# Patient Record
Sex: FEMALE | Race: WHITE | Employment: FULL TIME | URBAN - METROPOLITAN AREA
[De-identification: names, ages, dates, MRNs, and addresses within clinical notes are randomized per-mention and may not be internally consistent; named-entity substitution may affect disease eponyms.]

---

## 2017-04-11 ENCOUNTER — HOSPITAL ENCOUNTER (OUTPATIENT)
Dept: WOMENS IMAGING | Age: 62
Discharge: HOME | End: 2017-04-11
Payer: COMMERCIAL

## 2017-04-11 DIAGNOSIS — Z12.39 SCREENING BREAST EXAMINATION: ICD-10-CM

## 2017-04-11 PROCEDURE — G0202 SCR MAMMO BI INCL CAD: HCPCS

## 2019-09-05 ENCOUNTER — HOSPITAL ENCOUNTER (OUTPATIENT)
Dept: WOMENS IMAGING | Age: 64
Discharge: HOME | End: 2019-09-07
Payer: COMMERCIAL

## 2019-09-05 DIAGNOSIS — Z12.31 ENCOUNTER FOR SCREENING MAMMOGRAM FOR BREAST CANCER: ICD-10-CM

## 2019-09-05 PROCEDURE — 77067 SCR MAMMO BI INCL CAD: CPT

## 2021-01-27 ENCOUNTER — HOSPITAL ENCOUNTER (OUTPATIENT)
Dept: WOMENS IMAGING | Age: 66
Discharge: HOME | End: 2021-01-29
Payer: COMMERCIAL

## 2021-01-27 ENCOUNTER — APPOINTMENT (OUTPATIENT)
Dept: WOMENS IMAGING | Age: 66
End: 2021-01-27
Payer: COMMERCIAL

## 2021-01-27 DIAGNOSIS — Z12.31 ENCOUNTER FOR SCREENING MAMMOGRAM FOR BREAST CANCER: ICD-10-CM

## 2021-01-27 PROCEDURE — 77067 SCR MAMMO BI INCL CAD: CPT

## 2022-03-01 ENCOUNTER — HOSPITAL ENCOUNTER (OUTPATIENT)
Dept: WOMENS IMAGING | Age: 67
Discharge: HOME | End: 2022-03-03
Payer: COMMERCIAL

## 2022-03-01 VITALS — HEIGHT: 63 IN

## 2022-03-01 DIAGNOSIS — Z12.39 BREAST SCREENING: ICD-10-CM

## 2022-03-01 DIAGNOSIS — Z12.39 SCREENING BREAST EXAMINATION: ICD-10-CM

## 2022-03-01 PROCEDURE — 77067 SCR MAMMO BI INCL CAD: CPT

## 2023-03-02 ENCOUNTER — HOSPITAL ENCOUNTER (OUTPATIENT)
Dept: WOMENS IMAGING | Age: 68
Discharge: HOME | End: 2023-03-04
Payer: COMMERCIAL

## 2023-03-02 DIAGNOSIS — Z12.39 SCREENING BREAST EXAMINATION: ICD-10-CM

## 2023-03-02 PROCEDURE — 77063 BREAST TOMOSYNTHESIS BI: CPT

## 2023-07-14 PROBLEM — K74.60 CIRRHOSIS (MULTI): Status: ACTIVE | Noted: 2023-07-14

## 2023-07-14 PROBLEM — E55.9 VITAMIN D DEFICIENCY: Status: ACTIVE | Noted: 2023-07-14

## 2023-07-14 PROBLEM — R53.83 FATIGUE: Status: ACTIVE | Noted: 2023-07-14

## 2023-07-14 PROBLEM — N95.2 VAGINAL ATROPHY: Status: ACTIVE | Noted: 2023-07-14

## 2023-07-14 PROBLEM — H61.23 BILATERAL IMPACTED CERUMEN: Status: ACTIVE | Noted: 2023-07-14

## 2023-07-14 PROBLEM — N39.3 STRESS INCONTINENCE OF URINE: Status: ACTIVE | Noted: 2023-07-14

## 2023-07-14 PROBLEM — D69.6 THROMBOCYTOPENIA (CMS-HCC): Status: ACTIVE | Noted: 2023-07-14

## 2023-07-14 PROBLEM — M81.0 OSTEOPOROSIS, SENILE: Status: ACTIVE | Noted: 2023-07-14

## 2023-07-14 PROBLEM — Z86.19 HEPATITIS C VIRUS INFECTION CURED AFTER ANTIVIRAL DRUG THERAPY: Status: ACTIVE | Noted: 2023-07-14

## 2023-07-14 RX ORDER — MULTIVITAMIN
1 TABLET ORAL DAILY
COMMUNITY
Start: 2019-02-28

## 2023-07-14 RX ORDER — PETROLATUM,WHITE/LANOLIN
OINTMENT (GRAM) TOPICAL
COMMUNITY
Start: 2022-08-29

## 2023-07-14 RX ORDER — LYSINE HCL 500 MG
2 TABLET ORAL DAILY
COMMUNITY
Start: 2019-02-28

## 2023-07-14 RX ORDER — ACETAMINOPHEN 500 MG
TABLET ORAL
COMMUNITY
Start: 2019-04-04

## 2023-08-04 PROBLEM — E78.5 HYPERLIPIDEMIA: Status: ACTIVE | Noted: 2023-08-04

## 2023-08-04 PROBLEM — H61.20 IMPACTED CERUMEN: Status: ACTIVE | Noted: 2023-08-04

## 2023-08-04 PROBLEM — H52.223 REGULAR ASTIGMATISM OF BOTH EYES: Status: ACTIVE | Noted: 2020-07-07

## 2023-08-04 PROBLEM — R00.0 TACHYCARDIA: Status: ACTIVE | Noted: 2023-08-04

## 2023-08-04 PROBLEM — I10 HYPERTENSION: Status: ACTIVE | Noted: 2023-08-04

## 2023-08-29 PROBLEM — Z00.00 ENCOUNTER FOR HEALTH MAINTENANCE EXAMINATION IN ADULT: Status: ACTIVE | Noted: 2023-08-29

## 2023-08-29 NOTE — PROGRESS NOTES
"Annual Comprehensive Medical Exam:    68 y.o. female presents for annual comprehensive medical evaluation and preventive services screening.      Recent hospitalizations, surgeries or ER visits: denies     Diet: healthy  Caffeine per day: 1-2  Water per day: lots  Exercise: every day 50 push ups/50 sit ups, walking   Alcohol:denies  Tobacco/Rec drugs: denies  Dentist: twice a year  Optometrist: yearly glasses and contact   Mammogram: 4/19/23   Pelvic and Pap: 8/10/20 neg   Dexa Scans: -insurance would not approve  Colonoscopy: never   Cologuard: 9/1/21 neg   Dexa: declines  Stress test:      Family history and social history reviewed.   Allowed to report any questions or concerns.      Chronic Hepatitis C and cirrhosis diagnosed approx 2014--(ex- was IV drug user): LENNY Castro.   Liver Ultrasound every 6 months  Completes Fibroscan yearly     Thrombocytopenia:   No reports of bleeding     HTN:   No longer taking b/p meds     Taking Calcium + Vit D, MVI and Vit D 2000 iu     Urinary leakage:   States that she can run in the morning and will not have urinary leakage.   Will walk later in the day and can have some minor leakage    She would like a 3 moles evaluated.   Both moles on arms have developed within the past year and have grown. They can become irritated.   States that she has one mole on her back that previously had cryo however not all of it \"fell off\"    Past Medical History:   Diagnosis Date    Personal history of (healed) traumatic fracture     History of fracture of wrist    Personal history of other diseases of the digestive system     History of cirrhosis    Personal history of other infectious and parasitic diseases 08/29/2022    Hepatitis C virus infection cured after antiviral drug therapy    Personal history of other medical treatment     H/O mammogram    Unspecified cirrhosis of liver (CMS/HCC) 08/29/2022    Cirrhosis      Past Surgical History:   Procedure Laterality Date    OTHER " "SURGICAL HISTORY  02/06/2019    Tonsillectomy    OTHER SURGICAL HISTORY  02/06/2019    Wrist fracture repair    OTHER SURGICAL HISTORY  08/10/2020    Breast biopsy    OTHER SURGICAL HISTORY  08/23/2021    Esophagogastroduodenoscopy     Family History   Problem Relation Name Age of Onset    Hypertension Mother         Social History     Socioeconomic History    Marital status:      Spouse name: Not on file    Number of children: Not on file    Years of education: Not on file    Highest education level: Not on file   Occupational History    Not on file   Tobacco Use    Smoking status: Never    Smokeless tobacco: Never   Substance and Sexual Activity    Alcohol use: Not Currently    Drug use: Never    Sexual activity: Not on file   Other Topics Concern    Not on file   Social History Narrative    Not on file     Social Determinants of Health     Financial Resource Strain: Not on file   Food Insecurity: Not on file   Transportation Needs: Not on file   Physical Activity: Not on file   Stress: Not on file   Social Connections: Not on file   Intimate Partner Violence: Not on file   Housing Stability: Not on file       Current Outpatient Medications on File Prior to Visit   Medication Sig Dispense Refill    calcium carbonate-vit D3-min 600 mg calcium- 400 unit tablet Take 2 tablets by mouth once daily.      cholecalciferol (Vitamin D-3) 50 mcg (2,000 unit) capsule Take by mouth.      glucosamine sulfate 500 mg capsule Take by mouth.      multivitamin (Multiple Vitamins) tablet Take 1 tablet by mouth once daily.       No current facility-administered medications on file prior to visit.       Allergies   Allergen Reactions    Lisinopril Cough    Nsaids (Non-Steroidal Anti-Inflammatory Drug) Unknown    Sulfa (Sulfonamide Antibiotics) Headache       ROS:   Refer to HPI  Denies fever, CP, SOB, headaches or GI upset    Visit Vitals  /84   Pulse 84   Ht 1.6 m (5' 3\")   Wt 68.5 kg (151 lb)   SpO2 96%   BMI 26.75 kg/m² " "  Smoking Status Never   BSA 1.74 m²         Physical Exam  Gen: Alert and oriented x3 female in no acute distress.  HEENT: Head is normocephalic.  Pupils equal and reactive to light.  Tympanic membranes are clear.  Neck is supple without adenopathy or carotid bruits.  Heart: Regular rate and rhythm without murmurs.  Lungs: Clear to auscultation bilaterally.  Breast:      Deferred to Gyn  Pelvic:  Deferred to Gyn   Abdomen: Soft with normal bowel sounds.  No masses or pain to palpation.  No bruits auscultated.  Extremities: Good range of motion of all joints.  No significant edema. Pedal pulses +1-2/4  Neuro: No signs of focal neurologic deficit.  No tremor.  Speech and hearing are normal.  DTRs +3/4;  Muscle Strength +5/5.  Musculoskeletal: Spine with good ROM.  Leg lengths are equal.  Psych: normal affect.  No suicidal ideation.  Good judgment and insight.  Nevus:   Left upper arm:  0.3 cm x 0.3 cm brown raised  Right FA: 0.5 cm x 0.5 cm brown raised  Left thoracic area (bra line): \"L\" shaped 1 cm by 0.7 cm brown minimally raised    Destruction of lesion    Date/Time: 8/30/2023 12:17 PM    Performed by: AQUILINO Webb  Authorized by: AQUILINO Webb    Number of Lesions: 3  Lesion 1:     Body area: upper extremity    Upper extremity location: L upper arm    Malignancy: malignancy unknown      Destruction method: cryotherapy    Lesion 2:     Body area: upper extremity    Upper extremity location: R lower arm    Malignancy: malignancy unknown      Destruction method: cryotherapy    Lesion 3:     Body area: trunk    Trunk location: back    Malignancy: malignancy unknown      Destruction method: cryotherapy      Comments:  PROCEDURE NOTE:  Lesions were frozen with liquid nitrogen.  2 freeze-thaw cycles.    Anticipate lesions falling off within 2 weeks.  There is no special treatment required.  Patient was instructed to call office if they become red or painful.  If lesions return, will " biopsy.        Diagnosis/Plan:     1. Encounter for health maintenance examination in adult      2. Hepatitis C virus infection cured after antiviral drug therapy  Follow-up with specialist per their discretion/direction.     3. Hepatic cirrhosis, unspecified hepatic cirrhosis type, unspecified whether ascites present (CMS/HCC)  Follow-up with specialist per their discretion/direction.     4. Thrombocytopenia (CMS/HCC)  - Comprehensive metabolic panel; Future  - CBC; Future    5. Stress incontinence of urine  No new orders at this time    6. Atypical nevi  Anticipate lesions falling off within 2 weeks.  There is no special treatment required.  Patient was instructed to call office if they become red or painful.  If lesions return, will biopsy.    7. Vitamin D deficiency  Continue Vit D supplement    8. Well woman exam with routine gynecological exam  - Referral to Gynecology; Future    9. Overweight with body mass index (BMI) of 26 to 26.9 in adult  Patient encouraged to follow diet of lower carbohydrates and increase vegetable and fruit intake.   Patient also encouraged to increase water intake to 80 ounces/day.   Start or continue exercise for at least 30 minutes a day on most days of the week.     offers various ways to learn about healthy eating and healthy weight loss.           Medications refills will be completed as discussed.     Any labs or testing that is ordered will be reviewed and the results will be in your chart .   You can review these via  InflowControl.     Follow up six months for medication management.     Prescriptions will not be filled unless you are compliant with your follow-up appointments or have a follow-up appointment scheduled as per the instruction of your provider. Refills for medications should be requested at the time of your office visit.     Please allow one week for refill requests to be completed.     Contact office with any questions or concerns.   Preferred communication is via   MyChart  Please contact [email or web address] if having issues with  MyChart    Denice Anton [facility]  [address], [address]  [place] [address]  Phone: [phone]    **Charting was completed using voice recognition technology and may include unintended errors**

## 2023-08-30 ENCOUNTER — OFFICE VISIT (OUTPATIENT)
Dept: PRIMARY CARE | Facility: CLINIC | Age: 68
End: 2023-08-30
Payer: COMMERCIAL

## 2023-08-30 VITALS
HEIGHT: 63 IN | DIASTOLIC BLOOD PRESSURE: 84 MMHG | SYSTOLIC BLOOD PRESSURE: 130 MMHG | OXYGEN SATURATION: 96 % | HEART RATE: 84 BPM | WEIGHT: 151 LBS | BODY MASS INDEX: 26.75 KG/M2

## 2023-08-30 DIAGNOSIS — Z00.00 ENCOUNTER FOR HEALTH MAINTENANCE EXAMINATION IN ADULT: Primary | ICD-10-CM

## 2023-08-30 DIAGNOSIS — E55.9 VITAMIN D DEFICIENCY: ICD-10-CM

## 2023-08-30 DIAGNOSIS — D22.9 ATYPICAL NEVI: ICD-10-CM

## 2023-08-30 DIAGNOSIS — Z86.19 HEPATITIS C VIRUS INFECTION CURED AFTER ANTIVIRAL DRUG THERAPY: ICD-10-CM

## 2023-08-30 DIAGNOSIS — D69.6 THROMBOCYTOPENIA (CMS-HCC): ICD-10-CM

## 2023-08-30 DIAGNOSIS — R53.83 OTHER FATIGUE: ICD-10-CM

## 2023-08-30 DIAGNOSIS — E66.3 OVERWEIGHT WITH BODY MASS INDEX (BMI) OF 26 TO 26.9 IN ADULT: ICD-10-CM

## 2023-08-30 DIAGNOSIS — K74.60 HEPATIC CIRRHOSIS, UNSPECIFIED HEPATIC CIRRHOSIS TYPE, UNSPECIFIED WHETHER ASCITES PRESENT (MULTI): ICD-10-CM

## 2023-08-30 DIAGNOSIS — Z01.419 WELL WOMAN EXAM WITH ROUTINE GYNECOLOGICAL EXAM: ICD-10-CM

## 2023-08-30 DIAGNOSIS — N39.3 STRESS INCONTINENCE OF URINE: ICD-10-CM

## 2023-08-30 PROCEDURE — 3008F BODY MASS INDEX DOCD: CPT | Performed by: NURSE PRACTITIONER

## 2023-08-30 PROCEDURE — 3079F DIAST BP 80-89 MM HG: CPT | Performed by: NURSE PRACTITIONER

## 2023-08-30 PROCEDURE — 1160F RVW MEDS BY RX/DR IN RCRD: CPT | Performed by: NURSE PRACTITIONER

## 2023-08-30 PROCEDURE — 1036F TOBACCO NON-USER: CPT | Performed by: NURSE PRACTITIONER

## 2023-08-30 PROCEDURE — 17110 DESTRUCTION B9 LES UP TO 14: CPT | Performed by: NURSE PRACTITIONER

## 2023-08-30 PROCEDURE — 3075F SYST BP GE 130 - 139MM HG: CPT | Performed by: NURSE PRACTITIONER

## 2023-08-30 PROCEDURE — 1159F MED LIST DOCD IN RCRD: CPT | Performed by: NURSE PRACTITIONER

## 2023-08-30 PROCEDURE — 99397 PER PM REEVAL EST PAT 65+ YR: CPT | Performed by: NURSE PRACTITIONER

## 2023-08-30 RX ORDER — SODIUM FLUORIDE 0.1 MG/ML
RINSE ORAL
COMMUNITY

## 2023-08-30 ASSESSMENT — PATIENT HEALTH QUESTIONNAIRE - PHQ9
1. LITTLE INTEREST OR PLEASURE IN DOING THINGS: NOT AT ALL
SUM OF ALL RESPONSES TO PHQ9 QUESTIONS 1 & 2: 0
2. FEELING DOWN, DEPRESSED OR HOPELESS: NOT AT ALL

## 2023-10-24 ENCOUNTER — APPOINTMENT (OUTPATIENT)
Dept: OBSTETRICS AND GYNECOLOGY | Facility: CLINIC | Age: 68
End: 2023-10-24
Payer: COMMERCIAL

## 2023-11-07 ENCOUNTER — TELEPHONE (OUTPATIENT)
Dept: GASTROENTEROLOGY | Facility: HOSPITAL | Age: 68
End: 2023-11-07
Payer: COMMERCIAL

## 2023-11-07 DIAGNOSIS — Z86.19 HEPATITIS C VIRUS INFECTION CURED AFTER ANTIVIRAL DRUG THERAPY: ICD-10-CM

## 2023-11-27 ENCOUNTER — APPOINTMENT (OUTPATIENT)
Dept: GASTROENTEROLOGY | Facility: CLINIC | Age: 68
End: 2023-11-27
Payer: COMMERCIAL

## 2024-01-02 ENCOUNTER — APPOINTMENT (OUTPATIENT)
Dept: OBSTETRICS AND GYNECOLOGY | Facility: CLINIC | Age: 69
End: 2024-01-02
Payer: COMMERCIAL

## 2024-01-03 ENCOUNTER — CLINICAL SUPPORT (OUTPATIENT)
Dept: GASTROENTEROLOGY | Facility: CLINIC | Age: 69
End: 2024-01-03
Payer: COMMERCIAL

## 2024-01-03 DIAGNOSIS — Z86.19 HEPATITIS C VIRUS INFECTION CURED AFTER ANTIVIRAL DRUG THERAPY: ICD-10-CM

## 2024-01-03 PROCEDURE — 91200 LIVER ELASTOGRAPHY: CPT | Performed by: INTERNAL MEDICINE

## 2024-02-26 ENCOUNTER — APPOINTMENT (OUTPATIENT)
Dept: GASTROENTEROLOGY | Facility: CLINIC | Age: 69
End: 2024-02-26
Payer: COMMERCIAL

## 2024-03-05 ENCOUNTER — APPOINTMENT (OUTPATIENT)
Dept: OBSTETRICS AND GYNECOLOGY | Facility: CLINIC | Age: 69
End: 2024-03-05
Payer: COMMERCIAL

## 2024-03-08 ENCOUNTER — APPOINTMENT (OUTPATIENT)
Dept: GASTROENTEROLOGY | Facility: CLINIC | Age: 69
End: 2024-03-08
Payer: COMMERCIAL

## 2024-03-18 NOTE — PATIENT INSTRUCTIONS
Patients with advanced fibrosis or cirrhosis are at increased risk for liver cancer.  Successful treatment of liver cancer depends on early detection.  You should have regular interval screening for liver cancer every 6 months.  A blood test called Alpha-fetoprotein (AFP) and ultrasound of the liver is an important part of liver cancer screening to detect tumors.  You should also expect to have a follow up appointment with your liver doctor every 6 months.     [phone]    (Please see below for department name when scheduling)    Please have the following done before your next appointment:    [x]   LABS Due : Today and Sept   [x]   IMAGING Type : Ultrasound Due : First available and Sept  (Department Radiology)  [x]   FOLLOW UP with Dr. Castro Due : Sept  (Department Gastro)       If you have any questions or need assistance, please don't hesitate to contact us.   Dr. Castro: Office [phone] Fax: [phone]  Hepatology Nurse Coordinator: Nneka VILLANUEVA [phone]

## 2024-03-19 ENCOUNTER — OFFICE VISIT (OUTPATIENT)
Dept: GASTROENTEROLOGY | Facility: CLINIC | Age: 69
End: 2024-03-19
Payer: COMMERCIAL

## 2024-03-19 VITALS
TEMPERATURE: 97.4 F | HEART RATE: 80 BPM | DIASTOLIC BLOOD PRESSURE: 92 MMHG | WEIGHT: 154 LBS | SYSTOLIC BLOOD PRESSURE: 155 MMHG | BODY MASS INDEX: 27.28 KG/M2

## 2024-03-19 DIAGNOSIS — K76.9 LIVER DISEASE, CHRONIC, WITH CIRRHOSIS (MULTI): ICD-10-CM

## 2024-03-19 DIAGNOSIS — Z86.19 HEPATITIS C VIRUS INFECTION CURED AFTER ANTIVIRAL DRUG THERAPY: ICD-10-CM

## 2024-03-19 DIAGNOSIS — K74.60 LIVER DISEASE, CHRONIC, WITH CIRRHOSIS (MULTI): ICD-10-CM

## 2024-03-19 PROCEDURE — 1159F MED LIST DOCD IN RCRD: CPT | Performed by: INTERNAL MEDICINE

## 2024-03-19 PROCEDURE — 3008F BODY MASS INDEX DOCD: CPT | Performed by: INTERNAL MEDICINE

## 2024-03-19 PROCEDURE — 99214 OFFICE O/P EST MOD 30 MIN: CPT | Performed by: INTERNAL MEDICINE

## 2024-03-19 PROCEDURE — 1036F TOBACCO NON-USER: CPT | Performed by: INTERNAL MEDICINE

## 2024-03-19 PROCEDURE — 3080F DIAST BP >= 90 MM HG: CPT | Performed by: INTERNAL MEDICINE

## 2024-03-19 PROCEDURE — 3077F SYST BP >= 140 MM HG: CPT | Performed by: INTERNAL MEDICINE

## 2024-03-19 PROCEDURE — 1126F AMNT PAIN NOTED NONE PRSNT: CPT | Performed by: INTERNAL MEDICINE

## 2024-03-19 ASSESSMENT — PAIN SCALES - GENERAL: PAINLEVEL: 0-NO PAIN

## 2024-03-20 ASSESSMENT — ENCOUNTER SYMPTOMS
VOMITING: 0
TROUBLE SWALLOWING: 0
NAUSEA: 0
BLOOD IN STOOL: 0
FEVER: 0
RECTAL PAIN: 0
ANAL BLEEDING: 0
UNEXPECTED WEIGHT CHANGE: 0
ABDOMINAL PAIN: 0
COLOR CHANGE: 0
ABDOMINAL DISTENTION: 0
DIARRHEA: 0
CHILLS: 0
CONSTIPATION: 0
SHORTNESS OF BREATH: 0

## 2024-03-20 NOTE — PROGRESS NOTES
Subjective  She has been well and has no interim complaints of fluid overload or cognitive impairment    Review of Systems   Constitutional:  Negative for chills, fever and unexpected weight change.   HENT:  Negative for trouble swallowing.    Respiratory:  Negative for shortness of breath.    Cardiovascular:  Negative for chest pain.   Gastrointestinal:  Negative for abdominal distention, abdominal pain, anal bleeding, blood in stool, constipation, diarrhea, nausea, rectal pain and vomiting.   Skin:  Negative for color change.       Physical Exam  Vitals reviewed.   Constitutional:       General: She is awake.      Appearance: Normal appearance.   HENT:      Head: Normocephalic and atraumatic.      Nose: Nose normal.      Mouth/Throat:      Mouth: Mucous membranes are moist.   Eyes:      Pupils: Pupils are equal, round, and reactive to light.   Cardiovascular:      Rate and Rhythm: Normal rate.   Pulmonary:      Effort: Pulmonary effort is normal.   Neurological:      Mental Status: She is alert and oriented to person, place, and time. Mental status is at baseline.   Psychiatric:         Attention and Perception: Attention and perception normal.         Mood and Affect: Mood normal.         Behavior: Behavior normal.       LABS:   Lab Results   Component Value Date    ALBUMIN 4.7 12/02/2022    BILITOT 0.8 12/02/2022    BILIDIR 0.1 12/02/2022    ALKPHOS 84 12/02/2022    ALT 30 12/02/2022    AST 30 12/02/2022    PROT 8.1 12/02/2022    F5WEFPJVDTT 160 03/26/2019    MITOAB NEGATIVE 03/26/2019    SMOOTHMUSCAB POSITIVE 03/26/2019    CERULOPLSM 27 03/26/2019    HAVTO REACTIVE (A) 03/26/2019    HEPBSAB < 3.1 03/26/2019    HEPBCAB NONREACTIVE 03/26/2019    HEPBSAG NONREACTIVE 03/26/2019    INR 1.0 12/02/2022    FERRITIN 541 (H) 03/26/2019    IRON 220 (H) 03/26/2019    IRONSAT 69 (H) 03/26/2019      Lab Results   Component Value Date    ALBUMIN 4.7 12/02/2022    BILITOT 0.8 12/02/2022    BILIDIR 0.1 12/02/2022    ALKPHOS 84  "12/02/2022    ALT 30 12/02/2022    AST 30 12/02/2022    PROT 8.1 12/02/2022      Lab Results   Component Value Date    GLUCOSE 82 12/02/2022    CALCIUM 10.1 12/02/2022     12/02/2022    K 4.3 12/02/2022    CO2 30 12/02/2022     12/02/2022    BUN 15 12/02/2022    CREATININE 0.78 12/02/2022     Lab Results   Component Value Date    WBC 4.4 12/02/2022    HGB 14.5 12/02/2022    HCT 42.8 12/02/2022    MCV 96 12/02/2022     (L) 12/02/2022     Lab Results   Component Value Date    INR 1.0 12/02/2022      Lab Results   Component Value Date     12/02/2022    CREATININE 0.78 12/02/2022    BILITOT 0.8 12/02/2022    INR 1.0 12/02/2022     No results found for: \"AFP\"   Lab Results   Component Value Date    FERRITIN 541 (H) 03/26/2019    IRON 220 (H) 03/26/2019    IRONSAT 69 (H) 03/26/2019      No results found for: \"HEPCAB\", \"HCVRNAPCR\", \"HCVGENO\"  Lab Results   Component Value Date    HEPBSAG NONREACTIVE 03/26/2019    HEPBSAB < 3.1 03/26/2019    HEPBCAB NONREACTIVE 03/26/2019      No results found for: \"HYKX945\", \"FDPW822\"     === 07/27/23 ===    US ABDOMEN LIMITED LIVER    - Impression -  Slightly coarsened liver echotexture. Stable small hepatic cyst. No  new sonographically detectable focal liver lesion.    3 mm probable polyp adjacent to fold in the gallbladder, similar to  11/28/2022.              Liver Elastography (Fibroscan)  The fibroscan score is consistent with fibroscan score : F4 (cirrhosis)   fibrosis  The CAP score is consistent with CAP SCORE : S0 steatosis steatosis      Cirrhosis (CMS/HCC)  This patient has cirrhosis and we discussed natural history. we emphasized the importance of health maintenance interventions to prevent complications of liver disease including  a) lifelong abstinence  b) MELD/US/AFP every 6 months to assess liver function and survey for HCC  c) initiation of carvedilol if fibroscan and other fibrosis non invasive tests are consistent with clinically significant " portal hypertension  d) watch out for symptoms of decompensated liver disease including cognitive impairment, excessive somnolence, lower extremity edema and increase in abdominal girth  e) follow up in the office every 6 months  f) perform fibroscan annually

## 2024-03-20 NOTE — ASSESSMENT & PLAN NOTE
This patient has cirrhosis and we discussed natural history. we emphasized the importance of health maintenance interventions to prevent complications of liver disease including  a) lifelong abstinence  b) MELD/US/AFP every 6 months to assess liver function and survey for HCC  c) initiation of carvedilol if fibroscan and other fibrosis non invasive tests are consistent with clinically significant portal hypertension  d) watch out for symptoms of decompensated liver disease including cognitive impairment, excessive somnolence, lower extremity edema and increase in abdominal girth  e) follow up in the office every 6 months  f) perform fibroscan annually

## 2024-03-27 ENCOUNTER — APPOINTMENT (OUTPATIENT)
Dept: RADIOLOGY | Facility: CLINIC | Age: 69
End: 2024-03-27
Payer: COMMERCIAL

## 2024-03-28 ENCOUNTER — HOSPITAL ENCOUNTER (OUTPATIENT)
Dept: RADIOLOGY | Facility: CLINIC | Age: 69
Discharge: HOME | End: 2024-03-28
Payer: COMMERCIAL

## 2024-03-28 ENCOUNTER — LAB (OUTPATIENT)
Dept: LAB | Facility: LAB | Age: 69
End: 2024-03-28
Payer: COMMERCIAL

## 2024-03-28 DIAGNOSIS — K76.9 LIVER DISEASE, CHRONIC, WITH CIRRHOSIS (MULTI): ICD-10-CM

## 2024-03-28 DIAGNOSIS — K74.60 HEPATIC CIRRHOSIS, UNSPECIFIED HEPATIC CIRRHOSIS TYPE, UNSPECIFIED WHETHER ASCITES PRESENT (MULTI): ICD-10-CM

## 2024-03-28 DIAGNOSIS — Z86.19 HEPATITIS C VIRUS INFECTION CURED AFTER ANTIVIRAL DRUG THERAPY: ICD-10-CM

## 2024-03-28 DIAGNOSIS — K74.60 LIVER DISEASE, CHRONIC, WITH CIRRHOSIS (MULTI): ICD-10-CM

## 2024-03-28 DIAGNOSIS — R53.83 OTHER FATIGUE: ICD-10-CM

## 2024-03-28 DIAGNOSIS — D69.6 THROMBOCYTOPENIA (CMS-HCC): ICD-10-CM

## 2024-03-28 LAB
AFP SERPL-MCNC: <4 NG/ML (ref 0–9)
ALBUMIN SERPL BCP-MCNC: 4.5 G/DL (ref 3.4–5)
ALP SERPL-CCNC: 98 U/L (ref 33–136)
ALT SERPL W P-5'-P-CCNC: 20 U/L (ref 7–45)
ANION GAP SERPL CALC-SCNC: 9 MMOL/L (ref 10–20)
AST SERPL W P-5'-P-CCNC: 20 U/L (ref 9–39)
BILIRUB DIRECT SERPL-MCNC: 0.1 MG/DL (ref 0–0.3)
BILIRUB SERPL-MCNC: 0.4 MG/DL (ref 0–1.2)
BUN SERPL-MCNC: 21 MG/DL (ref 6–23)
CALCIUM SERPL-MCNC: 9.3 MG/DL (ref 8.6–10.3)
CHLORIDE SERPL-SCNC: 108 MMOL/L (ref 98–107)
CO2 SERPL-SCNC: 29 MMOL/L (ref 21–32)
CREAT SERPL-MCNC: 0.85 MG/DL (ref 0.5–1.05)
EGFRCR SERPLBLD CKD-EPI 2021: 76 ML/MIN/1.73M*2
ERYTHROCYTE [DISTWIDTH] IN BLOOD BY AUTOMATED COUNT: 12.7 % (ref 11.5–14.5)
GLUCOSE SERPL-MCNC: 95 MG/DL (ref 74–99)
HCT VFR BLD AUTO: 42.6 % (ref 36–46)
HGB BLD-MCNC: 14.2 G/DL (ref 12–16)
INR PPP: 1 (ref 0.9–1.1)
MCH RBC QN AUTO: 33 PG (ref 26–34)
MCHC RBC AUTO-ENTMCNC: 33.3 G/DL (ref 32–36)
MCV RBC AUTO: 99 FL (ref 80–100)
NRBC BLD-RTO: 0 /100 WBCS (ref 0–0)
PLATELET # BLD AUTO: 152 X10*3/UL (ref 150–450)
POTASSIUM SERPL-SCNC: 4.6 MMOL/L (ref 3.5–5.3)
PROT SERPL-MCNC: 7.1 G/DL (ref 6.4–8.2)
PROTHROMBIN TIME: 10.8 SECONDS (ref 9.8–12.8)
RBC # BLD AUTO: 4.3 X10*6/UL (ref 4–5.2)
SODIUM SERPL-SCNC: 141 MMOL/L (ref 136–145)
WBC # BLD AUTO: 4.8 X10*3/UL (ref 4.4–11.3)

## 2024-03-28 PROCEDURE — 80053 COMPREHEN METABOLIC PANEL: CPT

## 2024-03-28 PROCEDURE — 85610 PROTHROMBIN TIME: CPT

## 2024-03-28 PROCEDURE — 76705 ECHO EXAM OF ABDOMEN: CPT | Performed by: RADIOLOGY

## 2024-03-28 PROCEDURE — 82105 ALPHA-FETOPROTEIN SERUM: CPT

## 2024-03-28 PROCEDURE — 85027 COMPLETE CBC AUTOMATED: CPT

## 2024-03-28 PROCEDURE — 82248 BILIRUBIN DIRECT: CPT

## 2024-03-28 PROCEDURE — 36415 COLL VENOUS BLD VENIPUNCTURE: CPT

## 2024-03-28 PROCEDURE — 76705 ECHO EXAM OF ABDOMEN: CPT

## 2024-04-23 ENCOUNTER — OFFICE VISIT (OUTPATIENT)
Dept: OBSTETRICS AND GYNECOLOGY | Facility: CLINIC | Age: 69
End: 2024-04-23
Payer: COMMERCIAL

## 2024-04-23 VITALS
SYSTOLIC BLOOD PRESSURE: 140 MMHG | BODY MASS INDEX: 27.71 KG/M2 | WEIGHT: 156.4 LBS | DIASTOLIC BLOOD PRESSURE: 86 MMHG | HEIGHT: 63 IN

## 2024-04-23 DIAGNOSIS — R92.333 HETEROGENEOUSLY DENSE TISSUE OF BOTH BREASTS ON MAMMOGRAPHY: ICD-10-CM

## 2024-04-23 DIAGNOSIS — Z12.31 BREAST CANCER SCREENING BY MAMMOGRAM: ICD-10-CM

## 2024-04-23 DIAGNOSIS — Z01.419 NORMAL GYNECOLOGIC EXAMINATION: Primary | ICD-10-CM

## 2024-04-23 DIAGNOSIS — Z12.4 SCREENING FOR CERVICAL CANCER: ICD-10-CM

## 2024-04-23 PROBLEM — N95.2 VAGINAL ATROPHY: Status: RESOLVED | Noted: 2023-07-14 | Resolved: 2024-04-23

## 2024-04-23 PROCEDURE — 1036F TOBACCO NON-USER: CPT | Performed by: OBSTETRICS & GYNECOLOGY

## 2024-04-23 PROCEDURE — 3008F BODY MASS INDEX DOCD: CPT | Performed by: OBSTETRICS & GYNECOLOGY

## 2024-04-23 PROCEDURE — 3079F DIAST BP 80-89 MM HG: CPT | Performed by: OBSTETRICS & GYNECOLOGY

## 2024-04-23 PROCEDURE — 88175 CYTOPATH C/V AUTO FLUID REDO: CPT

## 2024-04-23 PROCEDURE — 99387 INIT PM E/M NEW PAT 65+ YRS: CPT | Performed by: OBSTETRICS & GYNECOLOGY

## 2024-04-23 PROCEDURE — 87624 HPV HI-RISK TYP POOLED RSLT: CPT

## 2024-04-23 PROCEDURE — 3077F SYST BP >= 140 MM HG: CPT | Performed by: OBSTETRICS & GYNECOLOGY

## 2024-04-23 PROCEDURE — 1159F MED LIST DOCD IN RCRD: CPT | Performed by: OBSTETRICS & GYNECOLOGY

## 2024-04-23 NOTE — PROGRESS NOTES
"GYNECOLOGY PROGRESS NOTE      CC:    Chief Complaint   Patient presents with    Annual Exam     New patient - annual exam - no other issues  Pap 8/2020 neg HPV test  Mamm 3/2023 het dense  Dexa - never had one  Cologuard - 2021  Chaperone evelin yoo ma          HPI:  Esme Mena is here for a routine GYN examination.  No GYN c/o, no AUB.        Depression screen:  negative  Fall screen:  negative  DV screen:  negative      ROS:  GI - no blood in BMs  URO - no hematuria  GYN - no AUB or vaginal discharge  PSYCH - mood OK        PHYSICAL EXAM:  /86 (BP Location: Left arm, Patient Position: Sitting)   Ht 1.6 m (5' 3\")   Wt 70.9 kg (156 lb 6.4 oz)   BMI 27.71 kg/m²   GEN:  A&O, NAD  ABD:  NT/ND, soft, no palpable masses  URO:  atrophic urethral meatus, no bladder TTP  GYN:    -atrophic vulva w/o lesions or ulcers  -atrophic vagina without discharge or lesions  -normal cervix without lesions or discharge or CMT  -uterus NT/NE  -adnexa mobile and NT/NE  -perineum w/o lesions or ulcers  -rectal  no external hemorrhoids or lesions, internal exam deferred  BREAST:  no masses or TTP, no skin lesions or nipple discharge  PSYCH:  normal affect, non-anxious        IMPRESSION/PLAN:  Problem List Items Addressed This Visit    None  Visit Diagnoses       Normal gynecologic examination    -  Primary    Heterogeneously dense tissue of both breasts on mammography                 Pap/HPV done today, normal pap no longer indicated as > age 65 w/o previous history of HGSIL however last pap was normal in HPV and no -testing was done and repeat should have been done in 2023 at age 67.   .  Thereafter no future indication for pap smear screening > age 65 in this patient as no Hx of HGSIL and not currently immunosuppressed (prior hepatitis C but no longer active after antiviral treatment).     Mammogram up to date and normal. Breast density on last mammogram is heterogeneous.  Decreased sensitivity of mammogram screening with " dense breasts discussed with patient, yearly ROSALBA recommended for screening.   Alternate imaging discussed--and not elected at this time     Colon CA screening: Cologuard 9/2021, repeat due later this year (PCP ordered prior testing)    DEXA scan:  none, see PCP for this screening.    Follow-up in 2 years for next Medicare GYN examination, patient is low risk and does not have indication for annual GYN examination.        Nader Huerta, DO

## 2024-05-07 ENCOUNTER — HOSPITAL ENCOUNTER (OUTPATIENT)
Dept: WOMENS IMAGING | Age: 69
Discharge: HOME | End: 2024-05-09
Payer: COMMERCIAL

## 2024-05-07 VITALS — HEIGHT: 64 IN

## 2024-05-07 DIAGNOSIS — Z12.31 ENCOUNTER FOR SCREENING MAMMOGRAM FOR MALIGNANT NEOPLASM OF BREAST: ICD-10-CM

## 2024-05-07 PROCEDURE — 77063 BREAST TOMOSYNTHESIS BI: CPT

## 2024-09-04 ENCOUNTER — APPOINTMENT (OUTPATIENT)
Dept: PRIMARY CARE | Facility: CLINIC | Age: 69
End: 2024-09-04
Payer: COMMERCIAL

## 2024-09-04 VITALS
SYSTOLIC BLOOD PRESSURE: 142 MMHG | DIASTOLIC BLOOD PRESSURE: 80 MMHG | WEIGHT: 156 LBS | HEART RATE: 93 BPM | OXYGEN SATURATION: 100 % | HEIGHT: 63 IN | BODY MASS INDEX: 27.64 KG/M2

## 2024-09-04 DIAGNOSIS — Z12.11 SCREENING FOR COLON CANCER: ICD-10-CM

## 2024-09-04 DIAGNOSIS — D69.6 THROMBOCYTOPENIA (CMS-HCC): ICD-10-CM

## 2024-09-04 DIAGNOSIS — E78.2 MIXED HYPERLIPIDEMIA: ICD-10-CM

## 2024-09-04 DIAGNOSIS — E55.9 VITAMIN D DEFICIENCY: ICD-10-CM

## 2024-09-04 DIAGNOSIS — R32 URINARY INCONTINENCE, UNSPECIFIED TYPE: ICD-10-CM

## 2024-09-04 DIAGNOSIS — Z86.19 HEPATITIS C VIRUS INFECTION CURED AFTER ANTIVIRAL DRUG THERAPY: ICD-10-CM

## 2024-09-04 DIAGNOSIS — M81.0 SENILE OSTEOPOROSIS: ICD-10-CM

## 2024-09-04 DIAGNOSIS — I10 HYPERTENSION, UNSPECIFIED TYPE: ICD-10-CM

## 2024-09-04 DIAGNOSIS — Z00.00 HEALTHCARE MAINTENANCE: Primary | ICD-10-CM

## 2024-09-04 PROCEDURE — 3078F DIAST BP <80 MM HG: CPT | Performed by: NURSE PRACTITIONER

## 2024-09-04 PROCEDURE — 99397 PER PM REEVAL EST PAT 65+ YR: CPT | Performed by: NURSE PRACTITIONER

## 2024-09-04 PROCEDURE — 1158F ADVNC CARE PLAN TLK DOCD: CPT | Performed by: NURSE PRACTITIONER

## 2024-09-04 PROCEDURE — 1123F ACP DISCUSS/DSCN MKR DOCD: CPT | Performed by: NURSE PRACTITIONER

## 2024-09-04 PROCEDURE — 1160F RVW MEDS BY RX/DR IN RCRD: CPT | Performed by: NURSE PRACTITIONER

## 2024-09-04 PROCEDURE — 1159F MED LIST DOCD IN RCRD: CPT | Performed by: NURSE PRACTITIONER

## 2024-09-04 PROCEDURE — 1036F TOBACCO NON-USER: CPT | Performed by: NURSE PRACTITIONER

## 2024-09-04 PROCEDURE — 3008F BODY MASS INDEX DOCD: CPT | Performed by: NURSE PRACTITIONER

## 2024-09-04 PROCEDURE — 3077F SYST BP >= 140 MM HG: CPT | Performed by: NURSE PRACTITIONER

## 2024-09-04 PROCEDURE — 99214 OFFICE O/P EST MOD 30 MIN: CPT | Performed by: NURSE PRACTITIONER

## 2024-09-04 RX ORDER — LOSARTAN POTASSIUM 25 MG/1
25 TABLET ORAL DAILY
Qty: 90 TABLET | Refills: 0 | Status: SHIPPED | OUTPATIENT
Start: 2024-09-04

## 2024-09-04 NOTE — PROGRESS NOTES
Subjective   Patient ID: Esme Mena is a 69 y.o. female who presents for CPE.    HPI     68 y.o. female presents for annual comprehensive medical evaluation and preventive services screening.       Recent hospitalizations, surgeries or ER visits: denies      Diet: healthy  Caffeine per day: 1-2  Water per day: lots  Exercise:  walking   Alcohol: denies  Tobacco: denies  Dentist: twice a year  Optometrist: yearly glasses and contact   Mammogram: 5/7/24  Pelvic and Pap: 4/23/24-states no longer needs pap-Dr Nader Huerta   Dexa Scans: -insurance would not approve  Colonoscopy: never   Cologuard: 9/1/21 neg   Dexa: declines  Stress test:      Family history and social history reviewed.   Allowed to report any questions or concerns.      passed away 2/24     Chronic Hepatitis C and cirrhosis diagnosed approx 2014--(ex- was IV drug user), thrombocytopenia: GI Saqibluz Castro.  Liver Ultrasound every 6 months  Completes Fibroscan yearly  No reports of bleeding     HTN:   No longer taking b/p meds     Taking Calcium + Vit D, MVI and Vit D 2000 iu     Urinary leakage:   Managed with panty liner     Past Medical History:   Diagnosis Date    History of hepatitis C     cured after antiviral drug therapy    Unspecified cirrhosis of liver (Multi) 08/29/2022    Cirrhosis     Past Surgical History:   Procedure Laterality Date    BREAST BIOPSY      ESOPHAGOGASTRODUODENOSCOPY      TONSILLECTOMY      WRIST FRACTURE SURGERY       Social History     Socioeconomic History    Marital status:      Spouse name: Not on file    Number of children: Not on file    Years of education: Not on file    Highest education level: Not on file   Occupational History    Not on file   Tobacco Use    Smoking status: Former     Types: Cigarettes    Smokeless tobacco: Never   Vaping Use    Vaping status: Never Used   Substance and Sexual Activity    Alcohol use: Not Currently    Drug use: Not Currently    Sexual activity: Not on  "file   Other Topics Concern    Not on file   Social History Narrative    Not on file     Social Determinants of Health     Financial Resource Strain: Not on file   Food Insecurity: Not on file   Transportation Needs: Not on file   Physical Activity: Not on file   Stress: Not on file   Social Connections: Not on file   Intimate Partner Violence: Not on file   Housing Stability: Not on file         Review of Systems    Objective   /78   Pulse 93   Ht 1.6 m (5' 3\")   Wt 70.8 kg (156 lb)   SpO2 100%   BMI 27.63 kg/m²     Physical Exam    Gen: Alert and oriented x3 female in no acute distress.  HEENT: Head is normocephalic.  Neck is supple without carotid bruits  Heart: Regular rate and rhythm without murmurs.  Lungs: Clear to auscultation bilaterally.  Breast:           Deferred to Gyn  Pelvic:       Deferred to Gyn   Abdomen: Soft with normal bowel sounds.  No masses or pain to palpation.   Extremities: Good range of motion of all joints.  No significant edema. Pedal pulses +1-2/4  Neuro: No signs of focal neurologic deficit.  No tremor.  Speech and hearing are normal.  DTRs +3/4;  Muscle Strength +5/5.  Musculoskeletal: Spine with good ROM.  Leg lengths are equal.  Skin: No significant or irregular nevi visualized.  Psych: normal affect.  No suicidal ideation.  Good judgment and insight.      Assessment/Plan      1. Healthcare maintenance  - CBC; Future  - Lipid panel; Future  - TSH with reflex to Free T4 if abnormal; Future  - Urinalysis with Reflex Microscopic; Future    2. Hypertension, unspecified type   Discussed importance of good blood pressure control to avoid long-term complications such as heart attack and stroke.  Patient is aware that blood pressure goal is less than 130/80.   Maintaining a regular exercise program and body mass index (BMI) less than 25 as well as a diet lower in carbohydrates will help reach these goals.   If you are monitoring your blood pressure at home, please bring your " blood pressure cuff at least once a year so we can complete comparative readings.  Restarted medication   - losartan (Cozaar) 25 mg tablet; Take 1 tablet (25 mg) by mouth once daily.  Dispense: 90 tablet; Refill: 0    3. Thrombocytopenia (CMS-HCC)  - CBC; Future  Follow-up with specialist per their discretion/direction.     4. Hepatitis C virus infection cured after antiviral drug therapy  Follow-up with specialist per their discretion/direction.     5. Vitamin D deficiency  Vitamin D lab ordered. If your level is low,  a script for a weekly dose of Vitamin D will be sent to pharmacy. You should start or continue a Multivitamin.    - Vitamin D 25-Hydroxy,Total (for eval of Vitamin D levels); Future    6. Urinary incontinence, unspecified type  Declined tx at this time    7. Senile osteoporosis    - XR DEXA bone density; Future    8. Screening for colon cancer    - Cologuard® colon cancer screening; Future  - Cologuard® colon cancer screening    Follow up: within 90 days for HTN    Medications refills will be completed as discussed.     Any labs or testing that is ordered will be reviewed and the results will be in your chart .   You can review these via  ReelGenie.     Prescriptions will not be filled unless you are compliant with your follow-up appointments or have a follow-up appointment scheduled as per the instruction of your provider. Refills for medications should be requested at the time of your office visit.     Please allow one week for refill requests to be completed.     Contact office with any questions or concerns.   Preferred communication is via  ReelGenie      Call  Services: [phone] to assist with scheduling.      Denice [facility]  [address], [address]  [place] [address]  Phone: [phone]    **Charting was completed using voice recognition technology and may include unintended errors**

## 2024-09-10 ENCOUNTER — HOSPITAL ENCOUNTER (OUTPATIENT)
Dept: RADIOLOGY | Facility: CLINIC | Age: 69
Discharge: HOME | End: 2024-09-10
Payer: COMMERCIAL

## 2024-09-10 DIAGNOSIS — K76.9 LIVER DISEASE, CHRONIC, WITH CIRRHOSIS (MULTI): ICD-10-CM

## 2024-09-10 DIAGNOSIS — Z86.19 HEPATITIS C VIRUS INFECTION CURED AFTER ANTIVIRAL DRUG THERAPY: ICD-10-CM

## 2024-09-10 DIAGNOSIS — K74.60 LIVER DISEASE, CHRONIC, WITH CIRRHOSIS (MULTI): ICD-10-CM

## 2024-09-10 PROCEDURE — 76705 ECHO EXAM OF ABDOMEN: CPT | Performed by: RADIOLOGY

## 2024-09-10 PROCEDURE — 76705 ECHO EXAM OF ABDOMEN: CPT

## 2024-09-19 ENCOUNTER — LAB (OUTPATIENT)
Dept: LAB | Facility: LAB | Age: 69
End: 2024-09-19
Payer: COMMERCIAL

## 2024-09-19 DIAGNOSIS — Z86.19 HEPATITIS C VIRUS INFECTION CURED AFTER ANTIVIRAL DRUG THERAPY: ICD-10-CM

## 2024-09-19 DIAGNOSIS — E55.9 VITAMIN D DEFICIENCY: ICD-10-CM

## 2024-09-19 DIAGNOSIS — K76.9 LIVER DISEASE, CHRONIC, WITH CIRRHOSIS (MULTI): ICD-10-CM

## 2024-09-19 DIAGNOSIS — Z00.00 HEALTHCARE MAINTENANCE: ICD-10-CM

## 2024-09-19 DIAGNOSIS — K74.60 LIVER DISEASE, CHRONIC, WITH CIRRHOSIS (MULTI): ICD-10-CM

## 2024-09-19 DIAGNOSIS — E78.2 MIXED HYPERLIPIDEMIA: ICD-10-CM

## 2024-09-19 LAB
25(OH)D3 SERPL-MCNC: 96 NG/ML (ref 30–100)
AFP SERPL-MCNC: <4 NG/ML (ref 0–9)
ALBUMIN SERPL BCP-MCNC: 4.6 G/DL (ref 3.4–5)
ALP SERPL-CCNC: 65 U/L (ref 33–136)
ALT SERPL W P-5'-P-CCNC: 20 U/L (ref 7–45)
ANION GAP SERPL CALC-SCNC: 13 MMOL/L (ref 10–20)
APPEARANCE UR: CLEAR
AST SERPL W P-5'-P-CCNC: 20 U/L (ref 9–39)
BACTERIA #/AREA URNS AUTO: ABNORMAL /HPF
BILIRUB DIRECT SERPL-MCNC: 0.2 MG/DL (ref 0–0.3)
BILIRUB SERPL-MCNC: 1.1 MG/DL (ref 0–1.2)
BILIRUB UR STRIP.AUTO-MCNC: NEGATIVE MG/DL
BUN SERPL-MCNC: 18 MG/DL (ref 6–23)
CALCIUM SERPL-MCNC: 9.7 MG/DL (ref 8.6–10.3)
CHLORIDE SERPL-SCNC: 104 MMOL/L (ref 98–107)
CHOLEST SERPL-MCNC: 139 MG/DL (ref 0–199)
CHOLESTEROL/HDL RATIO: 2.2
CO2 SERPL-SCNC: 27 MMOL/L (ref 21–32)
COLOR UR: YELLOW
CREAT SERPL-MCNC: 0.84 MG/DL (ref 0.5–1.05)
EGFRCR SERPLBLD CKD-EPI 2021: 76 ML/MIN/1.73M*2
ERYTHROCYTE [DISTWIDTH] IN BLOOD BY AUTOMATED COUNT: 11.8 % (ref 11.5–14.5)
GLUCOSE SERPL-MCNC: 91 MG/DL (ref 74–99)
GLUCOSE UR STRIP.AUTO-MCNC: NORMAL MG/DL
HCT VFR BLD AUTO: 43.4 % (ref 36–46)
HDLC SERPL-MCNC: 63.6 MG/DL
HGB BLD-MCNC: 14.7 G/DL (ref 12–16)
INR PPP: 1.1 (ref 0.9–1.1)
KETONES UR STRIP.AUTO-MCNC: NEGATIVE MG/DL
LDLC SERPL CALC-MCNC: 59 MG/DL
LEUKOCYTE ESTERASE UR QL STRIP.AUTO: NEGATIVE
MCH RBC QN AUTO: 33.3 PG (ref 26–34)
MCHC RBC AUTO-ENTMCNC: 33.9 G/DL (ref 32–36)
MCV RBC AUTO: 98 FL (ref 80–100)
MUCOUS THREADS #/AREA URNS AUTO: ABNORMAL /LPF
NITRITE UR QL STRIP.AUTO: NEGATIVE
NON HDL CHOLESTEROL: 75 MG/DL (ref 0–149)
NRBC BLD-RTO: 0 /100 WBCS (ref 0–0)
PH UR STRIP.AUTO: 6 [PH]
PLATELET # BLD AUTO: 184 X10*3/UL (ref 150–450)
POTASSIUM SERPL-SCNC: 4 MMOL/L (ref 3.5–5.3)
PROT SERPL-MCNC: 7.4 G/DL (ref 6.4–8.2)
PROT UR STRIP.AUTO-MCNC: NORMAL MG/DL
PROTHROMBIN TIME: 12.5 SECONDS (ref 9.8–12.8)
RBC # BLD AUTO: 4.41 X10*6/UL (ref 4–5.2)
RBC # UR STRIP.AUTO: NEGATIVE /UL
RBC #/AREA URNS AUTO: ABNORMAL /HPF
SODIUM SERPL-SCNC: 140 MMOL/L (ref 136–145)
SP GR UR STRIP.AUTO: 1.02
SQUAMOUS #/AREA URNS AUTO: ABNORMAL /HPF
TRIGL SERPL-MCNC: 84 MG/DL (ref 0–149)
TSH SERPL-ACNC: 1.84 MIU/L (ref 0.44–3.98)
UROBILINOGEN UR STRIP.AUTO-MCNC: NORMAL MG/DL
VLDL: 17 MG/DL (ref 0–40)
WBC # BLD AUTO: 6.6 X10*3/UL (ref 4.4–11.3)
WBC #/AREA URNS AUTO: ABNORMAL /HPF

## 2024-09-19 PROCEDURE — 82105 ALPHA-FETOPROTEIN SERUM: CPT

## 2024-09-19 PROCEDURE — 82248 BILIRUBIN DIRECT: CPT

## 2024-09-19 PROCEDURE — 81001 URINALYSIS AUTO W/SCOPE: CPT

## 2024-09-19 PROCEDURE — 80053 COMPREHEN METABOLIC PANEL: CPT

## 2024-09-19 PROCEDURE — 80061 LIPID PANEL: CPT

## 2024-09-19 PROCEDURE — 82306 VITAMIN D 25 HYDROXY: CPT

## 2024-09-19 PROCEDURE — 85610 PROTHROMBIN TIME: CPT

## 2024-09-19 PROCEDURE — 85027 COMPLETE CBC AUTOMATED: CPT

## 2024-09-19 PROCEDURE — 36415 COLL VENOUS BLD VENIPUNCTURE: CPT

## 2024-09-19 PROCEDURE — 84443 ASSAY THYROID STIM HORMONE: CPT

## 2024-09-25 LAB — NONINV COLON CA DNA+OCC BLD SCRN STL QL: NEGATIVE

## 2024-11-12 ENCOUNTER — APPOINTMENT (OUTPATIENT)
Dept: PRIMARY CARE | Facility: CLINIC | Age: 69
End: 2024-11-12
Payer: COMMERCIAL

## 2024-11-12 VITALS
SYSTOLIC BLOOD PRESSURE: 144 MMHG | HEART RATE: 93 BPM | DIASTOLIC BLOOD PRESSURE: 78 MMHG | BODY MASS INDEX: 27.64 KG/M2 | HEIGHT: 63 IN | WEIGHT: 156 LBS | OXYGEN SATURATION: 96 %

## 2024-11-12 DIAGNOSIS — I10 HYPERTENSION, UNSPECIFIED TYPE: Primary | ICD-10-CM

## 2024-11-12 PROCEDURE — 1160F RVW MEDS BY RX/DR IN RCRD: CPT | Performed by: NURSE PRACTITIONER

## 2024-11-12 PROCEDURE — 3008F BODY MASS INDEX DOCD: CPT | Performed by: NURSE PRACTITIONER

## 2024-11-12 PROCEDURE — 3077F SYST BP >= 140 MM HG: CPT | Performed by: NURSE PRACTITIONER

## 2024-11-12 PROCEDURE — 1123F ACP DISCUSS/DSCN MKR DOCD: CPT | Performed by: NURSE PRACTITIONER

## 2024-11-12 PROCEDURE — 99213 OFFICE O/P EST LOW 20 MIN: CPT | Performed by: NURSE PRACTITIONER

## 2024-11-12 PROCEDURE — 3078F DIAST BP <80 MM HG: CPT | Performed by: NURSE PRACTITIONER

## 2024-11-12 PROCEDURE — 1158F ADVNC CARE PLAN TLK DOCD: CPT | Performed by: NURSE PRACTITIONER

## 2024-11-12 PROCEDURE — 1036F TOBACCO NON-USER: CPT | Performed by: NURSE PRACTITIONER

## 2024-11-12 PROCEDURE — 1159F MED LIST DOCD IN RCRD: CPT | Performed by: NURSE PRACTITIONER

## 2024-11-12 RX ORDER — LOSARTAN POTASSIUM 50 MG/1
50 TABLET ORAL DAILY
Qty: 30 TABLET | Refills: 0 | Status: SHIPPED | OUTPATIENT
Start: 2024-11-12 | End: 2025-12-17

## 2024-11-12 NOTE — PROGRESS NOTES
"Subjective   Patient ID: Esme Mena is a 69 y.o. female who presents for Blood Pressure Check.    HPI     Recent hospitalizations, surgeries or ER visits: denies      Diet: healthy  Caffeine per day: 1-2  Water per day: lots  Exercise: every day 50 push ups/50 sit ups, walking   Alcohol: denies  Tobacco: denies  Mammogram: 5/7/24    Hypertension:   Med: Cozaar 25 mg  takes it at night  Blood pressures at home: is not taking at   Tolerating without side effects     Past Medical History:   Diagnosis Date    History of hepatitis C     cured after antiviral drug therapy    Unspecified cirrhosis of liver (Multi) 08/29/2022    Cirrhosis     Social History     Socioeconomic History    Marital status:      Spouse name: Not on file    Number of children: Not on file    Years of education: Not on file    Highest education level: Not on file   Occupational History    Not on file   Tobacco Use    Smoking status: Former     Types: Cigarettes    Smokeless tobacco: Never   Vaping Use    Vaping status: Never Used   Substance and Sexual Activity    Alcohol use: Not Currently    Drug use: Not Currently    Sexual activity: Not on file   Other Topics Concern    Not on file   Social History Narrative    Not on file     Social Drivers of Health     Financial Resource Strain: Not on file   Food Insecurity: Not on file   Transportation Needs: Not on file   Physical Activity: Not on file   Stress: Not on file   Social Connections: Not on file   Intimate Partner Violence: Not on file   Housing Stability: Not on file         Review of Systems    Objective   /80   Pulse 93   Ht 1.6 m (5' 3\")   Wt 70.8 kg (156 lb)   SpO2 96%   BMI 27.63 kg/m²     Visit Vitals  /78   Pulse 93   Ht 1.6 m (5' 3\")   Wt 70.8 kg (156 lb)   SpO2 96%   BMI 27.63 kg/m²   Smoking Status Former   BSA 1.77 m²       Physical Exam    Alert and oriented x 3  Neck supple without carotid bruit   Heart regular rate and rhythm without murmur  Lungs " clear to auscultation.  Gait is non-antalgic  Speech clear.  Hearing adequate.  Psych: Normal affect. Good judgment and insight.       Assessment/Plan     1. Hypertension, unspecified type (Primary)  Increased dose  - losartan (Cozaar) 50 mg tablet; Take 1 tablet (50 mg) by mouth once daily.  Dispense: 30 tablet; Refill: 0       Discussed importance of good blood pressure control to avoid long-term complications such as heart attack and stroke.  Patient is aware that blood pressure goal is less than 130/80.   Maintaining a regular exercise program and body mass index (BMI) less than 25 as well as a diet lower in carbohydrates will help reach these goals.   If you are monitoring your blood pressure at home, please bring your blood pressure cuff at least once a year so we can complete comparative readings.  Please have readings available for review    Follow up: 1 month virtual for HTN    Medications refills will be completed as discussed.     Any labs or testing that is ordered will be reviewed and the results will be in your chart .   You can review these via  [employer].     Prescriptions will not be filled unless you are compliant with your follow-up appointments or have a follow-up appointment scheduled as per the instruction of your provider. Refills for medications should be requested at the time of your office visit.     Please allow one week for refill requests to be completed.     Contact office with any questions or concerns.   Preferred communication is via  [employer]      Call  Services: [phone] to assist with scheduling.      Denice [facility]  [address], [address]  [place] [address]  Phone: [phone]    **Charting was completed using voice recognition technology and may include unintended errors**

## 2024-12-03 ENCOUNTER — OFFICE VISIT (OUTPATIENT)
Dept: PRIMARY CARE | Facility: CLINIC | Age: 69
End: 2024-12-03
Payer: COMMERCIAL

## 2024-12-03 ENCOUNTER — APPOINTMENT (OUTPATIENT)
Dept: PRIMARY CARE | Facility: CLINIC | Age: 69
End: 2024-12-03
Payer: COMMERCIAL

## 2024-12-03 VITALS
WEIGHT: 155 LBS | BODY MASS INDEX: 27.46 KG/M2 | HEART RATE: 77 BPM | OXYGEN SATURATION: 99 % | DIASTOLIC BLOOD PRESSURE: 78 MMHG | SYSTOLIC BLOOD PRESSURE: 134 MMHG | HEIGHT: 63 IN

## 2024-12-03 DIAGNOSIS — M25.561 ACUTE PAIN OF RIGHT KNEE: Primary | ICD-10-CM

## 2024-12-03 DIAGNOSIS — I10 HYPERTENSION, UNSPECIFIED TYPE: ICD-10-CM

## 2024-12-03 PROCEDURE — 1159F MED LIST DOCD IN RCRD: CPT | Performed by: NURSE PRACTITIONER

## 2024-12-03 PROCEDURE — 3008F BODY MASS INDEX DOCD: CPT | Performed by: NURSE PRACTITIONER

## 2024-12-03 PROCEDURE — 1160F RVW MEDS BY RX/DR IN RCRD: CPT | Performed by: NURSE PRACTITIONER

## 2024-12-03 PROCEDURE — 3078F DIAST BP <80 MM HG: CPT | Performed by: NURSE PRACTITIONER

## 2024-12-03 PROCEDURE — 1123F ACP DISCUSS/DSCN MKR DOCD: CPT | Performed by: NURSE PRACTITIONER

## 2024-12-03 PROCEDURE — 99213 OFFICE O/P EST LOW 20 MIN: CPT | Performed by: NURSE PRACTITIONER

## 2024-12-03 PROCEDURE — 3075F SYST BP GE 130 - 139MM HG: CPT | Performed by: NURSE PRACTITIONER

## 2024-12-03 PROCEDURE — 1036F TOBACCO NON-USER: CPT | Performed by: NURSE PRACTITIONER

## 2024-12-03 RX ORDER — LOSARTAN POTASSIUM 50 MG/1
50 TABLET ORAL DAILY
Qty: 90 TABLET | Refills: 3 | Status: SHIPPED | OUTPATIENT
Start: 2024-12-03

## 2024-12-03 ASSESSMENT — PATIENT HEALTH QUESTIONNAIRE - PHQ9
SUM OF ALL RESPONSES TO PHQ9 QUESTIONS 1 & 2: 0
2. FEELING DOWN, DEPRESSED OR HOPELESS: NOT AT ALL
1. LITTLE INTEREST OR PLEASURE IN DOING THINGS: NOT AT ALL
2. FEELING DOWN, DEPRESSED OR HOPELESS: NOT AT ALL

## 2024-12-03 NOTE — PROGRESS NOTES
"Subjective   Patient ID: Esme Mena is a 69 y.o. female who presents for Hypertension.    HPI     Follow up HTN, Last visit increased Cozaar dose     Hypertension:   Med: Cozaar 50 mg   Blood pressures at home:   Tolerating without side effects     2 1/2 weeks ago she slipped while feeding the birds  Fell on right hip. Area was very bruised  Also had intense pain to right knee   Injury now has resolved    Past Medical History:   Diagnosis Date    History of hepatitis C     cured after antiviral drug therapy    Unspecified cirrhosis of liver (Multi) 08/29/2022    Cirrhosis         Review of Systems    Objective   /78   Pulse 77   Ht 1.6 m (5' 3\")   Wt 70.3 kg (155 lb)   SpO2 99%   BMI 27.46 kg/m²   She brought in her b/p cuff     Physical Exam    Alert and oriented x 3  Appears healthy  Does not appear/sound dyspneic with conversation  Speech clear.  Hearing adequate.  Psych: Normal affect. Good judgment and insight.       Assessment/Plan     1. Hypertension, unspecified type     Discussed importance of good blood pressure control to avoid long-term complications such as heart attack and stroke.  Patient is aware that blood pressure goal is less than 130/80.   Maintaining a regular exercise program and body mass index (BMI) less than 25 as well as a diet lower in carbohydrates will help reach these goals.   If you are monitoring your blood pressure at home, please bring your blood pressure cuff at least once a year so we can complete comparative readings.  Stable.  Continue current medications.    - losartan (Cozaar) 50 mg tablet; Take 1 tablet (50 mg) by mouth once daily.  Dispense: 90 tablet; Refill: 3    2. Acute pain of right knee (Primary)  Resolved    Follow up: CPE 9/9/25     Medications refills will be completed as discussed.     Any labs or testing that is ordered will be reviewed and the results will be in your chart .   You can review these via  [employer].     Prescriptions will not be " filled unless you are compliant with your follow-up appointments or have a follow-up appointment scheduled as per the instruction of your provider. Refills for medications should be requested at the time of your office visit.     Please allow one week for refill requests to be completed.     Contact office with any questions or concerns.   Preferred communication is via [employer]      Call  Services: [phone] to assist with scheduling.      Denice [facility]  [address], [address]  [place] [address]  Phone: [phone]    **Charting was completed using voice recognition technology and may include unintended errors**

## 2025-04-22 ENCOUNTER — TELEPHONE (OUTPATIENT)
Dept: GASTROENTEROLOGY | Facility: CLINIC | Age: 70
End: 2025-04-22

## 2025-04-24 ENCOUNTER — TELEPHONE (OUTPATIENT)
Dept: GASTROENTEROLOGY | Facility: HOSPITAL | Age: 70
End: 2025-04-24
Payer: COMMERCIAL

## 2025-04-24 DIAGNOSIS — Z86.19 HEPATITIS C VIRUS INFECTION CURED AFTER ANTIVIRAL DRUG THERAPY: ICD-10-CM

## 2025-04-24 DIAGNOSIS — K76.9 LIVER DISEASE, CHRONIC, WITH CIRRHOSIS (MULTI): ICD-10-CM

## 2025-04-24 DIAGNOSIS — K74.60 LIVER DISEASE, CHRONIC, WITH CIRRHOSIS (MULTI): ICD-10-CM

## 2025-04-29 ENCOUNTER — APPOINTMENT (OUTPATIENT)
Dept: RADIOLOGY | Facility: CLINIC | Age: 70
End: 2025-04-29
Payer: COMMERCIAL

## 2025-04-29 DIAGNOSIS — K76.9 LIVER DISEASE, CHRONIC, WITH CIRRHOSIS (MULTI): ICD-10-CM

## 2025-04-29 DIAGNOSIS — Z86.19 HEPATITIS C VIRUS INFECTION CURED AFTER ANTIVIRAL DRUG THERAPY: ICD-10-CM

## 2025-04-29 DIAGNOSIS — K74.60 LIVER DISEASE, CHRONIC, WITH CIRRHOSIS (MULTI): ICD-10-CM

## 2025-04-29 PROCEDURE — 76705 ECHO EXAM OF ABDOMEN: CPT

## 2025-04-29 PROCEDURE — 76705 ECHO EXAM OF ABDOMEN: CPT | Performed by: RADIOLOGY

## 2025-04-30 LAB
AFP-TM SERPL-MCNC: 2.9 NG/ML
ALBUMIN SERPL-MCNC: 4.8 G/DL (ref 3.6–5.1)
ALP SERPL-CCNC: 74 U/L (ref 37–153)
ALT SERPL-CCNC: 22 U/L (ref 6–29)
ANION GAP SERPL CALCULATED.4IONS-SCNC: 10 MMOL/L (CALC) (ref 7–17)
AST SERPL-CCNC: 25 U/L (ref 10–35)
BASOPHILS # BLD AUTO: 62 CELLS/UL (ref 0–200)
BASOPHILS NFR BLD AUTO: 0.9 %
BILIRUB DIRECT SERPL-MCNC: 0.2 MG/DL
BILIRUB SERPL-MCNC: 0.9 MG/DL (ref 0.2–1.2)
BUN SERPL-MCNC: 18 MG/DL (ref 7–25)
CALCIUM SERPL-MCNC: 9.8 MG/DL (ref 8.6–10.4)
CHLORIDE SERPL-SCNC: 104 MMOL/L (ref 98–110)
CO2 SERPL-SCNC: 25 MMOL/L (ref 20–32)
CREAT SERPL-MCNC: 0.74 MG/DL (ref 0.5–1.05)
EGFRCR SERPLBLD CKD-EPI 2021: 89 ML/MIN/1.73M2
EOSINOPHIL # BLD AUTO: 90 CELLS/UL (ref 15–500)
EOSINOPHIL NFR BLD AUTO: 1.3 %
ERYTHROCYTE [DISTWIDTH] IN BLOOD BY AUTOMATED COUNT: 12.3 % (ref 11–15)
GLUCOSE SERPL-MCNC: 79 MG/DL (ref 65–139)
HCT VFR BLD AUTO: 42.5 % (ref 35–45)
HGB BLD-MCNC: 14.3 G/DL (ref 11.7–15.5)
INR PPP: 1.1
LYMPHOCYTES # BLD AUTO: 2125 CELLS/UL (ref 850–3900)
LYMPHOCYTES NFR BLD AUTO: 30.8 %
MCH RBC QN AUTO: 33.3 PG (ref 27–33)
MCHC RBC AUTO-ENTMCNC: 33.6 G/DL (ref 32–36)
MCV RBC AUTO: 98.8 FL (ref 80–100)
MONOCYTES # BLD AUTO: 469 CELLS/UL (ref 200–950)
MONOCYTES NFR BLD AUTO: 6.8 %
NEUTROPHILS # BLD AUTO: 4154 CELLS/UL (ref 1500–7800)
NEUTROPHILS NFR BLD AUTO: 60.2 %
PLATELET # BLD AUTO: 165 THOUSAND/UL (ref 140–400)
PMV BLD REES-ECKER: 11.4 FL (ref 7.5–12.5)
POTASSIUM SERPL-SCNC: 4.1 MMOL/L (ref 3.5–5.3)
PROT SERPL-MCNC: 7.6 G/DL (ref 6.1–8.1)
PROTHROMBIN TIME: 11.2 SEC (ref 9–11.5)
RBC # BLD AUTO: 4.3 MILLION/UL (ref 3.8–5.1)
SODIUM SERPL-SCNC: 139 MMOL/L (ref 135–146)
WBC # BLD AUTO: 6.9 THOUSAND/UL (ref 3.8–10.8)

## 2025-08-08 ENCOUNTER — HOSPITAL ENCOUNTER (OUTPATIENT)
Dept: WOMENS IMAGING | Age: 70
Discharge: HOME | End: 2025-08-10
Payer: COMMERCIAL

## 2025-08-08 VITALS — HEIGHT: 64 IN

## 2025-08-08 DIAGNOSIS — Z12.31 ENCOUNTER FOR SCREENING MAMMOGRAM FOR MALIGNANT NEOPLASM OF BREAST: ICD-10-CM

## 2025-08-08 PROCEDURE — 77067 SCR MAMMO BI INCL CAD: CPT

## 2025-09-09 ENCOUNTER — APPOINTMENT (OUTPATIENT)
Dept: PRIMARY CARE | Facility: CLINIC | Age: 70
End: 2025-09-09
Payer: COMMERCIAL